# Patient Record
Sex: MALE | Race: WHITE | Employment: FULL TIME | URBAN - METROPOLITAN AREA
[De-identification: names, ages, dates, MRNs, and addresses within clinical notes are randomized per-mention and may not be internally consistent; named-entity substitution may affect disease eponyms.]

---
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
## 2021-01-26 ENCOUNTER — NURSE TRIAGE (OUTPATIENT)
Dept: FAMILY MEDICINE CLINIC | Facility: CLINIC | Age: 21
End: 2021-01-26

## 2021-01-26 PROBLEM — F41.9 ANXIETY AND DEPRESSION: Status: ACTIVE | Noted: 2021-01-26

## 2021-01-26 PROBLEM — F41.0 PANIC ATTACKS: Status: ACTIVE | Noted: 2021-01-26

## 2021-01-26 PROBLEM — F32.A ANXIETY AND DEPRESSION: Status: ACTIVE | Noted: 2021-01-26

## 2021-01-26 NOTE — TELEPHONE ENCOUNTER
Action Requested: Summary for Provider     []  Critical Lab, Recommendations Needed  [] Need Additional Advice  []   FYI    []   Need Orders  [] Need Medications Sent to Pharmacy  []  Other     SUMMARY: Mom reports patient woke up with, \"a full blown moses

## 2021-01-26 NOTE — PROGRESS NOTES
This visit is conducted using Telemedicine with live, interactive video and audio during this Coronavirus pandemic. Please note that the following visit was completed using two-way, real-time interactive audio and/or video communication.   This has been consent form, related consents and the risks discussed. Lastly, the patient confirmed that they were in [place]. Included in this visit, time may have been spent reviewing labs, medications, radiology tests and decision making.  Appropriate medical de as he has been feeling the symptoms for a long time. He has not had good luck with seeing counselor due to insurance in the past.  ROS  A comprehensive 10 point review of systems was completed. Pertinent positives and negatives noted in the the HPI. appropriately. ASSESSMENT/PLAN:   Anxiety and depression  (primary encounter diagnosis)  Panic attacks    1. Anxiety and depression  Recommend starting medication as directed.   Highly recommend patient to see a counselor as well as psychiatrist.  Ynes Pandey

## 2021-01-28 ENCOUNTER — TELEPHONE (OUTPATIENT)
Dept: FAMILY MEDICINE CLINIC | Facility: CLINIC | Age: 21
End: 2021-01-28

## 2021-01-28 NOTE — TELEPHONE ENCOUNTER
Called patient usually [address] provide names for psychiatry  He can call his insurance to get names as well.   Patient verbalized understanding

## 2021-01-28 NOTE — TELEPHONE ENCOUNTER
Patient mom states she need more referrals for her son for physiatrist. The one's she have is to far from where they live.

## 2021-02-08 NOTE — PROGRESS NOTES
This visit is conducted using Telemedicine with live, interactive video and audio during this Coronavirus pandemic. Please note that the following visit was completed using two-way, real-time interactive audio and/or video communication.   This has been driving record. Mother states he will be working with an  as he has an upcoming court date    He denies any illicit drug use    ROS  A comprehensive 10 point review of systems was completed. Pertinent positives and negatives noted in the the HPI. encounter diagnosis)  Anxiety and depression    1. Panic attacks  Patient feels comfortable at this dose. Continue 10 mg  - PARoxetine HCl (PAXIL) 10 MG Oral Tab; Take 1 tablet (10 mg total) by mouth every morning. Dispense: 30 tablet; Refill: 2    2.  An

## 2021-03-11 NOTE — PROGRESS NOTES
This visit is conducted using Telemedicine with live, interactive video and audio during this Coronavirus pandemic. Please note that the following visit was completed using two-way, real-time interactive audio and/or video communication.   This has been KNOWN EXPOSURE TO ANYONE CONFIRMED FOR COVID 19.       Patient Active Problem List:     Acne vulgaris     Generalized anxiety disorder     Vertigo     Right ear impacted cerumen     Seasonal allergic rhinitis due to pollen     Nausea     Panic attacks     A ER.    Patient verbally understanding of plan.       Patient reminded to practice good health and safety measures including washing hands, social distancing, covering mouth when coughing/ sneezing, avoid social meetings/ gatherings in face of this Covid 23

## 2021-03-13 DIAGNOSIS — F41.1 GENERALIZED ANXIETY DISORDER: ICD-10-CM

## 2021-03-17 RX ORDER — BUSPIRONE HYDROCHLORIDE 5 MG/1
TABLET ORAL
Qty: 90 TABLET | Refills: 0 | Status: SHIPPED | OUTPATIENT
Start: 2021-03-17 | End: 2021-06-22 | Stop reason: ALTCHOICE

## 2021-06-07 ENCOUNTER — OFFICE VISIT (OUTPATIENT)
Dept: FAMILY MEDICINE CLINIC | Facility: CLINIC | Age: 21
End: 2021-06-07
Payer: MEDICAID

## 2021-06-07 ENCOUNTER — TELEPHONE (OUTPATIENT)
Dept: SURGERY | Facility: CLINIC | Age: 21
End: 2021-06-07

## 2021-06-07 VITALS
HEIGHT: 67 IN | SYSTOLIC BLOOD PRESSURE: 100 MMHG | BODY MASS INDEX: 20.4 KG/M2 | TEMPERATURE: 99 F | HEART RATE: 68 BPM | DIASTOLIC BLOOD PRESSURE: 63 MMHG | WEIGHT: 130 LBS

## 2021-06-07 DIAGNOSIS — S61.219D: Primary | ICD-10-CM

## 2021-06-07 PROCEDURE — 3074F SYST BP LT 130 MM HG: CPT | Performed by: FAMILY MEDICINE

## 2021-06-07 PROCEDURE — 3078F DIAST BP <80 MM HG: CPT | Performed by: FAMILY MEDICINE

## 2021-06-07 PROCEDURE — 3008F BODY MASS INDEX DOCD: CPT | Performed by: FAMILY MEDICINE

## 2021-06-07 PROCEDURE — 99213 OFFICE O/P EST LOW 20 MIN: CPT | Performed by: FAMILY MEDICINE

## 2021-06-07 RX ORDER — PAROXETINE 10 MG/1
TABLET, FILM COATED ORAL
COMMUNITY
Start: 2021-04-25 | End: 2021-06-22 | Stop reason: CLARIF

## 2021-06-07 NOTE — TELEPHONE ENCOUNTER
Spoke with pt. Offered appointment with Dr Sofy Holm at [phone] for L hand laceration per Dr Mer Szymanski referral.  Pt not sure he can make an appointment, will call us back. Pt aware Dr Sofy Holm is not in the office again till 6/14/21.

## 2021-06-07 NOTE — PROGRESS NOTES
HPI:    Patient ID: Chacha Josue is a 22year old adult. HPI  Patient presents with:  ER F/U: finger lacerations   Headache: on an offf X 1 week also gets pain  in left left eye       Patient seen at [facility].   He was seen ther fourth digits flexor aspect. He has superficial abrasion on his left palm. There is no bleeding. There is mild swelling. No discharge noted. Neurological:      Mental Status: Mary Gauthier is alert.                 ASSESSMENT/PLAN:   Laceration of

## 2021-06-22 ENCOUNTER — TELEMEDICINE (OUTPATIENT)
Dept: FAMILY MEDICINE CLINIC | Facility: CLINIC | Age: 21
End: 2021-06-22

## 2021-06-22 DIAGNOSIS — F32.A ANXIETY AND DEPRESSION: ICD-10-CM

## 2021-06-22 DIAGNOSIS — F41.9 ANXIETY AND DEPRESSION: ICD-10-CM

## 2021-06-22 DIAGNOSIS — F41.0 PANIC ATTACKS: ICD-10-CM

## 2021-06-22 RX ORDER — PAROXETINE 10 MG/1
10 TABLET, FILM COATED ORAL EVERY MORNING
Qty: 30 TABLET | Refills: 0 | Status: SHIPPED | OUTPATIENT
Start: 2021-06-22 | End: 2021-07-22

## 2021-06-22 NOTE — PROGRESS NOTES
Patient was driving when I called patient will be to visit. He states he was driving to his therapy appointment. Stated he just wanted his refill for paroxetine. Prescription sent in for 30 days.   Please schedule follow-up in 3 weeks  Please inform jose

## 2021-06-23 ENCOUNTER — TELEPHONE (OUTPATIENT)
Dept: FAMILY MEDICINE CLINIC | Facility: CLINIC | Age: 21
End: 2021-06-23

## 2021-06-23 NOTE — TELEPHONE ENCOUNTER
Left message to call back. [employer] message sent. Call center please follow up with the appointment.   Stefanie Reynolds MD  P Em Cc Im Fm Alg Rhe; P Em Rn Triage  Follow up 3 weeks anxiety/ depression , Please inform patient that he should not

## 2021-06-23 NOTE — PROGRESS NOTES
Changed to appointment encounter on 6/23/21. Attempted to call the patient and left a voice message to call us back.

## 2021-06-25 NOTE — TELEPHONE ENCOUNTER
!st attempt/left voice mail message for pt to call back. When the patient returns the call please see message below and help assist in scheduling an appointment.

## 2021-06-29 NOTE — TELEPHONE ENCOUNTER
[employer] message  not read yet,CSS already left a messages twice and latest one this morning, will postpone for a day and will re check if patient will call back and read the [employer] message before sending a NO PHONE RESPONSE LETTER. appointment.   Mes

## 2021-06-29 NOTE — TELEPHONE ENCOUNTER
2nd attempt/left voice mail message for pt to call back. When the patient returns the call please see message and help schedule an appointment.

## 2021-06-30 NOTE — TELEPHONE ENCOUNTER
A no response letter with the information was sent in the mail. Dr. Jaya Hobbs  The patient is not responding to us by phone call or [employer].

## 2021-07-22 NOTE — PROGRESS NOTES
This visit is conducted using Telemedicine with live, interactive video and audio during this Coronavirus pandemic. Please note that the following visit was completed using two-way, real-time interactive audio and/or video communication.   This has been Generalized anxiety disorder     Vertigo     Right ear impacted cerumen     Seasonal allergic rhinitis due to pollen     Nausea     Panic attacks     Anxiety and depression      Past Medical History:   Diagnosis Date   • PNEUMONIA   12/03    flovent; marni every morning. Dispense: 60 tablet; Refill: 0    3. Generalized anxiety disorder  As above  - PARoxetine HCl (PAXIL) 10 MG Oral Tab; Take 2 tablets (20 mg total) by mouth every morning. Dispense: 60 tablet;  Refill: 0        Patient reminded to practice g

## 2021-07-28 RX ORDER — PAROXETINE HYDROCHLORIDE 20 MG/1
20 TABLET, FILM COATED ORAL EVERY MORNING
Qty: 30 TABLET | Refills: 1 | Status: SHIPPED | OUTPATIENT
Start: 2021-07-28 | End: 2021-10-10

## 2021-07-28 NOTE — TELEPHONE ENCOUNTER
Mom (on RO) calling to confirm Pt is taking 20mg daily instead of 10mg daily before she picks up the prescription. Reviewed notes from [address] with [address]. Ivon Robles.   Confirmed she increased dose to 20mg daily and advised for Pt to call for concerns or issues with th

## 2021-07-28 NOTE — TELEPHONE ENCOUNTER
Mom calling to follow-up on PA. Per message below, there is no 20mg tablet. Stephanie Schultz, spoke with Costa Milian from pharmacy, states paroxetine does come in 20mg tablets and 30 days will be covered by [employer].     Dr. Marquise Fernandez, 20mg once a d

## 2021-07-28 NOTE — TELEPHONE ENCOUNTER
Mother is calling again. States that the pt needs a PA for this new dosage. (OV 07/22 increased from 1-10mg tab to 2-10mg tabs)  Insurance requires PA as it only covers a one tablet prescription. Please advise.  Pt has been out of his medication for one

## 2021-10-10 RX ORDER — PAROXETINE HYDROCHLORIDE 20 MG/1
TABLET, FILM COATED ORAL
Qty: 30 TABLET | Refills: 1 | Status: SHIPPED | OUTPATIENT
Start: 2021-10-10 | End: 2021-11-20

## 2021-10-15 NOTE — TELEPHONE ENCOUNTER
2nd attempt/left voice mail message for pt to call back. When the call is returned please see message below.

## 2021-11-20 RX ORDER — PAROXETINE HYDROCHLORIDE 20 MG/1
TABLET, FILM COATED ORAL
Qty: 30 TABLET | Refills: 0 | OUTPATIENT
Start: 2021-11-20

## 2021-11-29 ENCOUNTER — LAB ENCOUNTER (OUTPATIENT)
Dept: LAB | Age: 21
End: 2021-11-29
Attending: FAMILY MEDICINE
Payer: COMMERCIAL

## 2021-11-29 ENCOUNTER — OFFICE VISIT (OUTPATIENT)
Dept: FAMILY MEDICINE CLINIC | Facility: CLINIC | Age: 21
End: 2021-11-29
Payer: COMMERCIAL

## 2021-11-29 VITALS
WEIGHT: 133 LBS | DIASTOLIC BLOOD PRESSURE: 70 MMHG | HEART RATE: 73 BPM | BODY MASS INDEX: 20.88 KG/M2 | SYSTOLIC BLOOD PRESSURE: 107 MMHG | HEIGHT: 67 IN

## 2021-11-29 DIAGNOSIS — Z11.3 SCREENING EXAMINATION FOR STD (SEXUALLY TRANSMITTED DISEASE): ICD-10-CM

## 2021-11-29 DIAGNOSIS — Z00.00 WELL ADULT EXAM: Primary | ICD-10-CM

## 2021-11-29 DIAGNOSIS — F41.0 PANIC ATTACKS: ICD-10-CM

## 2021-11-29 DIAGNOSIS — F32.A ANXIETY AND DEPRESSION: ICD-10-CM

## 2021-11-29 DIAGNOSIS — Z00.00 WELL ADULT EXAM: ICD-10-CM

## 2021-11-29 DIAGNOSIS — F41.9 ANXIETY AND DEPRESSION: ICD-10-CM

## 2021-11-29 PROCEDURE — 87491 CHLMYD TRACH DNA AMP PROBE: CPT

## 2021-11-29 PROCEDURE — 36415 COLL VENOUS BLD VENIPUNCTURE: CPT

## 2021-11-29 PROCEDURE — 86803 HEPATITIS C AB TEST: CPT

## 2021-11-29 PROCEDURE — 3078F DIAST BP <80 MM HG: CPT | Performed by: FAMILY MEDICINE

## 2021-11-29 PROCEDURE — 87389 HIV-1 AG W/HIV-1&-2 AB AG IA: CPT

## 2021-11-29 PROCEDURE — 99395 PREV VISIT EST AGE 18-39: CPT | Performed by: FAMILY MEDICINE

## 2021-11-29 PROCEDURE — 3008F BODY MASS INDEX DOCD: CPT | Performed by: FAMILY MEDICINE

## 2021-11-29 PROCEDURE — 87591 N.GONORRHOEAE DNA AMP PROB: CPT

## 2021-11-29 PROCEDURE — 80053 COMPREHEN METABOLIC PANEL: CPT

## 2021-11-29 PROCEDURE — 80061 LIPID PANEL: CPT

## 2021-11-29 PROCEDURE — 87340 HEPATITIS B SURFACE AG IA: CPT

## 2021-11-29 PROCEDURE — 86780 TREPONEMA PALLIDUM: CPT

## 2021-11-29 PROCEDURE — 84443 ASSAY THYROID STIM HORMONE: CPT

## 2021-11-29 PROCEDURE — 3074F SYST BP LT 130 MM HG: CPT | Performed by: FAMILY MEDICINE

## 2021-11-29 PROCEDURE — 85025 COMPLETE CBC W/AUTO DIFF WBC: CPT

## 2021-11-29 RX ORDER — PAROXETINE HYDROCHLORIDE 20 MG/1
20 TABLET, FILM COATED ORAL EVERY MORNING
Qty: 90 TABLET | Refills: 1 | Status: SHIPPED | OUTPATIENT
Start: 2021-11-29

## 2021-11-29 NOTE — PROGRESS NOTES
HPI:    Patient ID: Neto Diane is a 22year old adult.     HPI  Patient presents with:  Routine Physical    Wt Readings from Last 6 Encounters:  11/29/21 : 133 lb (60.3 kg)  06/07/21 : 130 lb (59 kg)  10/29/20 : 120 lb (54.4 kg) (4 %, Z= -1.81)*  09/21/ 73   Ht 5' 7\" (1.702 m)   Wt 133 lb (60.3 kg)   BMI 20.83 kg/m²     Past Medical History:   Diagnosis Date   • PNEUMONIA   12/03    flovent; albuterol     Past Surgical History:   Procedure Laterality Date   • COLONOSCOPY N/A 9/21/2020    Procedure: COLON Administered   • DTAP 03/16/2001, 05/10/2001, 07/16/2001, 10/05/2002, 05/11/2006, 05/11/2006   • HEP A,Ped/Adol,(2 Dose) 07/07/2015, 03/30/2016   • HEP B 01/28/2001, 02/28/2001, 10/09/2001   • HIB 03/16/2001, 05/10/2001, 07/16/2001, 10/05/2002   • Hpv Viru No thyromegaly. Cardiovascular:      Rate and Rhythm: Normal rate and regular rhythm. Heart sounds: Normal heart sounds. No murmur heard. Pulmonary:      Effort: Pulmonary effort is normal.      Breath sounds: Normal breath sounds. No wheezing. WITH PLATELET; Future  - COMP METABOLIC PANEL (14); Future  - LIPID PANEL; Future  - TSH W REFLEX TO FREE T4; Future    2. Anxiety and depression  Stable  Seeing therapist   - PARoxetine 20 MG Oral Tab; Take 1 tablet (20 mg total) by mouth every morning.

## 2024-01-19 NOTE — TELEPHONE ENCOUNTER
Left message to call back prior to coming in for appt tomorrow. The last A1c I have is from nearly a year ago, so please have her get labs done today or this weekend so I have a more recent A1c to use for medication ordering.

## 2024-03-06 NOTE — TELEPHONE ENCOUNTER
Dr. Evelyne Chao    The patient was able to get in for Ultrasound 9/23. I called central scheduling to get patient in sooner and that is the soonest available date.   Per the representative I spoke with, even if it is ordered stat it is doubtful that patient will no lesions, no deformities, no traumatic injuries, no significant scars are present, chest wall non-tender, no masses present, breathing is unlabored without accessory muscle use,normal breath sounds

## (undated) DEVICE — 35 ML SYRINGE REGULAR TIP: Brand: MONOJECT

## (undated) DEVICE — MEDI-VAC NON-CONDUCTIVE SUCTION TUBING 6MM X 1.8M (6FT.) L: Brand: CARDINAL HEALTH

## (undated) DEVICE — Device: Brand: DEFENDO AIR/WATER/SUCTION AND BIOPSY VALVE

## (undated) DEVICE — LINE MNTR ADLT SET O2 INTMD

## (undated) DEVICE — Device: Brand: CUSTOM PROCEDURE KIT

## (undated) DEVICE — FORCEP RADIAL JAW 4

## (undated) DEVICE — CONMED SCOPE SAVER BITE BLOCK, 20X27 MM: Brand: SCOPE SAVER

## (undated) NOTE — LETTER
11/14/2018          To Whom It May Concern:    Debra Zaman is currently under my medical care and may not return to school at this time. Please excuse Belinda Curran for 2 days. He may return to school on Thursday, 11/15/18.   Activity is restricted as follows

## (undated) NOTE — LETTER
VACCINE ADMINISTRATION RECORD  PARENT / GUARDIAN APPROVAL  Date: 1/3/2019  Vaccine administered to: Darwin Meneses     : 2000    MRN: [id number]    A copy of the appropriate Centers for Disease Control and Prevention Vaccine Information statement ha

## (undated) NOTE — MR AVS SNAPSHOT
[facility] - [place] [address] [place]  [address]  [phone]               Thank you for choosing us for your health care visit with Aiyana Gonzalez DO.   We are glad to serve you and happy to provide you with this summary of

## (undated) NOTE — MR AVS SNAPSHOT
[facility] - [place] [address] [address] [id number] [phone]               Thank you for choosing us for your health care visit with Lexi Nazario MD.  We are glad to serve you and happy to provide you with this summary of y [address], [address] [facility]     Phone:  [phone]    - amoxicillin 875 MG Tabs               Visit [facility] online at  [email or web address]

## (undated) NOTE — Clinical Note
Follow up 3 weeks anxiety/ depression , Please inform patient that he should not be driving when doing a video visit.

## (undated) NOTE — LETTER
8/5/2020          To Whom It May Concern:    Bj Shanks is currently under my medical care and may not return to work at this time. Please excuse Karri Paige for 2 days. He may return to work on Friday August 7th, 2020.   Activity is restricted as follows:

## (undated) NOTE — LETTER
6/30/2021              [address]         Dear Jennifer Lim,    This letter is to inform you that our office has made several attempts to reach you by phone without success.   We were attempting to cont

## (undated) NOTE — LETTER
10/24/2017              [facility] [address]         To Whom It May Concern,      Chacha Josue is currently under my medical care and may not return to school at this time.   Please excuse Louisa Pena for 3

## (undated) NOTE — LETTER
VACCINE ADMINISTRATION RECORD  PARENT / GUARDIAN APPROVAL  Date: 2017  Vaccine administered to: Teja Perez     : 2000    MRN: [id number]    A copy of the appropriate Centers for Disease Control and Prevention Vaccine Information statement

## (undated) NOTE — Clinical Note
4/26/2017          To Whom It May Concern:    Teja Perez is currently under my medical care and may not return to school at this time. Please excuse Jennifer Lim from [employer] class or any sports for the remainder of the week  He may return to school on 4/27/17.

## (undated) NOTE — LETTER
7/30/2020          To Whom It May Concern:    Hugh Heath is currently under my medical care and may not return to work at this time. Please excuse José Manuel Toney for 3 days. He may return to work on 8/3/2020. Activity is restricted as follows: none.     If y

## (undated) NOTE — ED AVS SNAPSHOT
[facility] AND [facility] in [facility]  [address] [place]    Phone:  [phone]    Fax:  [phone]   MRN: [id number]    Department:  [facility] AND [facility] in [address] [place] [address]   Date of Visit: may not be covered by your plan. It is possible that the physician may not participate in your health insurance plan. This may result in a lower benefit level being available to you or other limited reimbursement.   The physician may seek payment directly If you have been prescribed any medication(s), please fill your prescription right away and begin taking the medication(s) as directed.   If you believe that any of the medications or instructions on this list is different from what your Primary Care doctor Patient [address] to help you get signed up for insurance coverage. Patient [address] is a Federal Navigator program that can help with your Affordable Care Act coverage, as well as all types of Medicaid plans.   To get signed up and covere

## (undated) NOTE — ED AVS SNAPSHOT
[facility] AND [facility] in [facility].  [address] [place]    Phone:  [phone]    Fax:  [phone]   MRN: [id number]    Department:  [facility] AND [facility] in [address] [place] [address]   Date of Visit: benefit level being available to you or other limited reimbursement. The physician may seek payment directly from you for amounts other than your deductible, co-payment, or co-insurance and for other services not covered under your health insurance plan. If you believe that any of the medications or instructions on this list is different from what your Primary Care doctor has instructed you - please continue to take your medications as instructed by your Primary Care doctor until you can check with your do Patient [address] to help you get signed up for insurance coverage. Patient [address] is a Federal Navigator program that can help with your Affordable Care Act coverage, as well as all types of Medicaid plans.   To get signed up and covere

## (undated) NOTE — LETTER
12/21/2020              Janet Beverage        [address] [facility]         Dear Kenney King,    This letter is to inform you that our office has made several attempts to reach you by phone without success.   We were attempting to con

## (undated) NOTE — LETTER
[facility] [employer] of Child Health Examination       Student's Name  [address] Birth Ar[place] Title                           Date    (If adding dates to the above immunization history section, put your initials by date(s) and sign here.)   ALTERNATIVE PROOF OF IMMUNITY   1.Clinical diagnosis (measles, mumps, hepatits B) is allowed when verified b •  Fluticasone Propionate 50 MCG/ACT Nasal Suspension, 2 sprays by Each Nare route nightly., Disp: 1 Bottle, Rfl: 3  •  Loratadine (CLARITIN) 10 MG Oral Cap, Take 10 mg by mouth nightly., Disp: 90 capsule, Rfl: 0  •  ibuprofen (ADVIL) 200 MG Oral Tab, Take PHYSICAL EXAMINATION REQUIREMENTS    Entire section below to be completed by MD//APN/PA       PHYSICAL EXAMINATION REQUIREMENTS (head circumference if <33 years old):   BP 99/60 (BP Location: Left arm, Patient Position: Sitting, Cuff Size: adult)   Puls Eyes Yes     Screen result:   Genito-Urinary Yes  LMP   Nose Yes  Neurological Yes    Throat Yes  Musculoskeletal Yes    Mouth/Dental Yes  Spinal examination Yes    Cardiovascular/HTN Yes  Nutritional status Yes    Respiratory Yes                   Diagnos

## (undated) NOTE — LETTER
9/14/2020             Re: Layne Mackey        [address] [facility] [address]         To whom it may concern    Mr. Layne Mackey has been dealing with illness since January 2020.   He is currently in the process of undergoing mul

## (undated) NOTE — LETTER
[facility] [employer] Child Health Examination       Student's Name  Jorge Sullivan Da Signature                                                                                                                                              Title                           Date    (If adding dates to the above immunization history section, put y Patient has no known allergies.  MEDICATION  (List all prescribed or taken on a regular basis.)    Current Outpatient Medications:   •  Albuterol Sulfate  (90 Base) MCG/ACT Inhalation Aero Soln, Inhale 2 puffs into the lungs every 6 (six) hours as ne appropriate personnel for health /educational purposes. Bone/Joint problem/injury/scoliosis?    Yes   No  Parent/Guardian Signature                                          Date     PHYSICAL EXAMINATION REQUIREMENTS    Entire section below to be completed Skin Yes  Endocrine Yes    Ears Yes                      Screen result: Gastrointestinal Yes    Eyes Yes     Screen result:   Genito-Urinary Yes  LMP   Nose Yes  Neurological Yes    Throat Yes  Musculoskeletal Yes    Mouth/Dental Yes  Spinal examination Danni Tran Rev 11/15                                                                    Printed by the [employer]

## (undated) NOTE — MR AVS SNAPSHOT
[facility] - [address]  [address] [phone]               Thank you for choosing us for your health care visit with Ellen Guzman MD.  We are glad to serve you and happy to provide you with this summary of yo Fluticasone Propionate 50 MCG/ACT Susp   2 sprays by Each Nare route nightly. Commonly known as:  FLONASE           Loratadine 10 MG Caps   Take 10 mg by mouth nightly.    What changed:  when to take this   Commonly known as:  Lee Pruett

## (undated) NOTE — LETTER
6/22/2020          To Whom It May Concern:    Chacha Josue is currently under my medical care and may not return to work at this time. Please excuse Louisa Pena for 3 days. From 6/23-6/25  Louisa Jean may return to work on 6/26/20.   Activity is restricted as follow

## (undated) NOTE — MR AVS SNAPSHOT
[facility] SPECIALTY [facility] - [place] [address] [address] [id number] [phone]               Thank you for choosing us for your health care visit with Ehsan Huang MD.  We are glad to serve you and happy to provide you with this summary of y TYLENOL COLD OR   Take by mouth.                    Educational Information     Healthy Active Living  An initiative of the American Academy of Pediatrics    Fact Sheet: Healthy Active Living for Families    Healthy nutrition starts as early as infancy wit Healthy active children are more likely to be healthy active adults!              Visit [facility] online at  [email or web address]

## (undated) NOTE — LETTER
2/8/2021             Re: Zenon Sires        [address] [facility] [address]         To whom it may concern    Mr. Zenon Kang suffers from anxiety and depression and panic attacks.  He was started on a new medication (Paxil) on